# Patient Record
Sex: FEMALE | Race: WHITE
[De-identification: names, ages, dates, MRNs, and addresses within clinical notes are randomized per-mention and may not be internally consistent; named-entity substitution may affect disease eponyms.]

---

## 2020-01-11 ENCOUNTER — HOSPITAL ENCOUNTER (EMERGENCY)
Dept: HOSPITAL 94 - ER | Age: 40
Discharge: HOME | End: 2020-01-11
Payer: MEDICAID

## 2020-01-11 VITALS — DIASTOLIC BLOOD PRESSURE: 38 MMHG | SYSTOLIC BLOOD PRESSURE: 106 MMHG

## 2020-01-11 VITALS — HEIGHT: 68 IN | WEIGHT: 114.64 LBS | BODY MASS INDEX: 17.37 KG/M2

## 2020-01-11 DIAGNOSIS — Z88.1: ICD-10-CM

## 2020-01-11 DIAGNOSIS — J20.9: Primary | ICD-10-CM

## 2020-01-11 DIAGNOSIS — Z88.8: ICD-10-CM

## 2020-01-11 DIAGNOSIS — F17.200: ICD-10-CM

## 2020-01-11 DIAGNOSIS — Z88.0: ICD-10-CM

## 2020-01-11 PROCEDURE — 96372 THER/PROPH/DIAG INJ SC/IM: CPT

## 2020-01-11 PROCEDURE — 99283 EMERGENCY DEPT VISIT LOW MDM: CPT

## 2020-05-09 ENCOUNTER — HOSPITAL ENCOUNTER (EMERGENCY)
Dept: HOSPITAL 94 - ER | Age: 40
LOS: 2 days | Discharge: PSYCHIATRIC OR SUBSTANCE USE FACILITY | End: 2020-05-11
Payer: COMMERCIAL

## 2020-05-09 VITALS — WEIGHT: 123.46 LBS | BODY MASS INDEX: 18.71 KG/M2 | HEIGHT: 68 IN

## 2020-05-09 DIAGNOSIS — Z88.1: ICD-10-CM

## 2020-05-09 DIAGNOSIS — F32.9: ICD-10-CM

## 2020-05-09 DIAGNOSIS — Z88.2: ICD-10-CM

## 2020-05-09 DIAGNOSIS — Z79.899: ICD-10-CM

## 2020-05-09 DIAGNOSIS — F17.200: ICD-10-CM

## 2020-05-09 DIAGNOSIS — Z88.0: ICD-10-CM

## 2020-05-09 DIAGNOSIS — R45.851: Primary | ICD-10-CM

## 2020-05-09 LAB
ALBUMIN SERPL BCP-MCNC: 4 G/DL (ref 3.4–5)
ALBUMIN/GLOB SERPL: 1.2 {RATIO} (ref 1.1–1.5)
ALP SERPL-CCNC: 44 IU/L (ref 46–116)
ALT SERPL W P-5'-P-CCNC: 20 U/L (ref 12–78)
AMPHETAMINES UR QL SCN: NEGATIVE
ANION GAP SERPL CALCULATED.3IONS-SCNC: 7 MMOL/L (ref 8–16)
AST SERPL W P-5'-P-CCNC: 19 U/L (ref 10–37)
BACTERIA URNS QL MICRO: (no result) /HPF
BARBITURATES UR QL SCN: NEGATIVE
BASOPHILS # BLD AUTO: 0.1 X10'3 (ref 0–0.2)
BASOPHILS NFR BLD AUTO: 0.9 % (ref 0–1)
BENZODIAZ UR QL SCN: NEGATIVE
BILIRUB SERPL-MCNC: 0.6 MG/DL (ref 0.1–1)
BUN SERPL-MCNC: 10 MG/DL (ref 7–18)
BUN/CREAT SERPL: 13.5 (ref 6.6–38)
BZE UR QL SCN: NEGATIVE
CALCIUM SERPL-MCNC: 9.3 MG/DL (ref 8.5–10.1)
CANNABINOIDS UR QL SCN: POSITIVE
CHLORIDE SERPL-SCNC: 105 MMOL/L (ref 99–107)
CLARITY UR: CLEAR
CO2 SERPL-SCNC: 27.3 MMOL/L (ref 24–32)
COLOR UR: YELLOW
CREAT SERPL-MCNC: 0.74 MG/DL (ref 0.4–0.9)
DEPRECATED SQUAMOUS URNS QL MICRO: (no result) /LPF
EOSINOPHIL # BLD AUTO: 0 X10'3 (ref 0–0.9)
EOSINOPHIL NFR BLD AUTO: 0.6 % (ref 0–6)
ERYTHROCYTE [DISTWIDTH] IN BLOOD BY AUTOMATED COUNT: 13.3 % (ref 11.5–14.5)
ETHANOL SERPL-MCNC: < 0.01 GM/DL (ref 0–0.01)
GFR SERPL CREATININE-BSD FRML MDRD: 88 ML/MIN
GLUCOSE SERPL-MCNC: 96 MG/DL (ref 70–104)
GLUCOSE UR STRIP-MCNC: NEGATIVE MG/DL
HCG UR QL: NEGATIVE
HCT VFR BLD AUTO: 39.8 % (ref 35–45)
HGB BLD-MCNC: 13.2 G/DL (ref 12–16)
HGB UR QL STRIP: (no result)
KETONES UR STRIP-MCNC: (no result) MG/DL
LEUKOCYTE ESTERASE UR QL STRIP: (no result)
LYMPHOCYTES # BLD AUTO: 1.9 X10'3 (ref 1.1–4.8)
LYMPHOCYTES NFR BLD AUTO: 29.9 % (ref 21–51)
MCH RBC QN AUTO: 32.7 PG (ref 27–31)
MCHC RBC AUTO-ENTMCNC: 33.3 G/DL (ref 33–36.5)
MCV RBC AUTO: 98.2 FL (ref 78–98)
METHADONE UR QL SCN: NEGATIVE
MONOCYTES # BLD AUTO: 0.6 X10'3 (ref 0–0.9)
MONOCYTES NFR BLD AUTO: 9.5 % (ref 2–12)
MUCOUS THREADS URNS QL MICRO: (no result) /LPF
NEUTROPHILS # BLD AUTO: 3.8 X10'3 (ref 1.8–7.7)
NEUTROPHILS NFR BLD AUTO: 59.1 % (ref 42–75)
NITRITE UR QL STRIP: NEGATIVE
OPIATES UR QL SCN: NEGATIVE
PCP UR QL SCN: NEGATIVE
PH UR STRIP: 6 [PH] (ref 4.8–8)
PLATELET # BLD AUTO: 226 X10'3 (ref 140–440)
PMV BLD AUTO: 8.7 FL (ref 7.4–10.4)
POTASSIUM SERPL-SCNC: 3.7 MMOL/L (ref 3.5–5.1)
PROT SERPL-MCNC: 7.3 G/DL (ref 6.4–8.2)
PROT UR QL STRIP: NEGATIVE MG/DL
RBC # BLD AUTO: 4.05 X10'6 (ref 4.2–5.6)
RBC #/AREA URNS HPF: (no result) /HPF (ref 0–2)
SODIUM SERPL-SCNC: 139 MMOL/L (ref 135–145)
SP GR UR STRIP: 1.02 (ref 1–1.03)
URN COLLECT METHOD CLASS: (no result)
UROBILINOGEN UR STRIP-MCNC: 1 E.U/DL (ref 0.2–1)
WBC # BLD AUTO: 6.4 X10'3 (ref 4.5–11)
WBC #/AREA URNS HPF: (no result) /HPF (ref 0–4)

## 2020-05-09 PROCEDURE — 99285 EMERGENCY DEPT VISIT HI MDM: CPT

## 2020-05-09 PROCEDURE — 80320 DRUG SCREEN QUANTALCOHOLS: CPT

## 2020-05-09 PROCEDURE — 80053 COMPREHEN METABOLIC PANEL: CPT

## 2020-05-09 PROCEDURE — 80305 DRUG TEST PRSMV DIR OPT OBS: CPT

## 2020-05-09 PROCEDURE — 85025 COMPLETE CBC W/AUTO DIFF WBC: CPT

## 2020-05-09 PROCEDURE — 81025 URINE PREGNANCY TEST: CPT

## 2020-05-09 PROCEDURE — 81001 URINALYSIS AUTO W/SCOPE: CPT

## 2020-05-09 PROCEDURE — 36415 COLL VENOUS BLD VENIPUNCTURE: CPT

## 2020-05-09 PROCEDURE — 84443 ASSAY THYROID STIM HORMONE: CPT

## 2020-05-10 RX ADMIN — BUPROPION HYDROCHLORIDE SCH MG: 150 TABLET, FILM COATED, EXTENDED RELEASE ORAL at 12:00

## 2020-05-10 RX ADMIN — BUPROPION HYDROCHLORIDE SCH MG: 150 TABLET, FILM COATED, EXTENDED RELEASE ORAL at 20:10

## 2020-05-11 ENCOUNTER — HOSPITAL ENCOUNTER (INPATIENT)
Dept: HOSPITAL 94 - ADULT MH | Age: 40
LOS: 5 days | Discharge: HOME | DRG: 885 | End: 2020-05-16
Attending: PSYCHIATRY & NEUROLOGY | Admitting: PSYCHIATRY & NEUROLOGY
Payer: COMMERCIAL

## 2020-05-11 VITALS — HEIGHT: 68 IN | BODY MASS INDEX: 18.83 KG/M2 | WEIGHT: 124.25 LBS

## 2020-05-11 VITALS — DIASTOLIC BLOOD PRESSURE: 75 MMHG | SYSTOLIC BLOOD PRESSURE: 139 MMHG

## 2020-05-11 VITALS — SYSTOLIC BLOOD PRESSURE: 105 MMHG | DIASTOLIC BLOOD PRESSURE: 63 MMHG

## 2020-05-11 VITALS — DIASTOLIC BLOOD PRESSURE: 84 MMHG | SYSTOLIC BLOOD PRESSURE: 133 MMHG

## 2020-05-11 DIAGNOSIS — F10.20: ICD-10-CM

## 2020-05-11 DIAGNOSIS — Z82.49: ICD-10-CM

## 2020-05-11 DIAGNOSIS — Z80.41: ICD-10-CM

## 2020-05-11 DIAGNOSIS — Z71.6: ICD-10-CM

## 2020-05-11 DIAGNOSIS — R45.851: ICD-10-CM

## 2020-05-11 DIAGNOSIS — Z71.41: ICD-10-CM

## 2020-05-11 DIAGNOSIS — Z98.51: ICD-10-CM

## 2020-05-11 DIAGNOSIS — F12.20: ICD-10-CM

## 2020-05-11 DIAGNOSIS — Z82.5: ICD-10-CM

## 2020-05-11 DIAGNOSIS — F43.12: ICD-10-CM

## 2020-05-11 DIAGNOSIS — Z88.0: ICD-10-CM

## 2020-05-11 DIAGNOSIS — Z80.49: ICD-10-CM

## 2020-05-11 DIAGNOSIS — Z88.2: ICD-10-CM

## 2020-05-11 DIAGNOSIS — F17.200: ICD-10-CM

## 2020-05-11 DIAGNOSIS — F33.2: Primary | ICD-10-CM

## 2020-05-11 DIAGNOSIS — F41.9: ICD-10-CM

## 2020-05-11 PROCEDURE — 99285 EMERGENCY DEPT VISIT HI MDM: CPT

## 2020-05-11 PROCEDURE — 87081 CULTURE SCREEN ONLY: CPT

## 2020-05-11 PROCEDURE — 83036 HEMOGLOBIN GLYCOSYLATED A1C: CPT

## 2020-05-11 PROCEDURE — 36415 COLL VENOUS BLD VENIPUNCTURE: CPT

## 2020-05-11 PROCEDURE — 80061 LIPID PANEL: CPT

## 2020-05-11 RX ADMIN — BUPROPION HYDROCHLORIDE SCH MG: 150 TABLET, FILM COATED, EXTENDED RELEASE ORAL at 08:08

## 2020-05-11 RX ADMIN — BUPROPION HYDROCHLORIDE SCH MG: 150 TABLET, FILM COATED, EXTENDED RELEASE ORAL at 20:14
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
## 2020-05-11 NOTE — NUR
Malnutrition consult: patient reporting 14-23 lb loss. Per documented 

weight history she weighed 52 kg on 1/12/2020 and current weight is 56.36 

kg (both scale weights). Has gained 9.6 lbs since January. No edema. 

Eating % of two meals documented on 5/10 in ED hold. Does not meet more than two 
criteria for malnutrition at this time.

-------------------------------------------------------------------------------

Addendum: 05/11/20 at 1525 by Naz Keyes RD

-------------------------------------------------------------------------------

Amended: Links added.

## 2020-05-12 VITALS — SYSTOLIC BLOOD PRESSURE: 111 MMHG | DIASTOLIC BLOOD PRESSURE: 66 MMHG

## 2020-05-12 VITALS — DIASTOLIC BLOOD PRESSURE: 50 MMHG | SYSTOLIC BLOOD PRESSURE: 105 MMHG

## 2020-05-12 LAB
CHOLEST SERPL-MCNC: 140 MG/DL (ref 0–200)
CHOLEST/HDLC SERPL: 2.3 {RATIO} (ref 0–4.99)
HBA1C MFR BLD: 5.5 % (ref 4.5–6.2)
HDLC SERPL-MCNC: 61 MG/DL (ref 35–60)
LDLC SERPL DIRECT ASSAY-MCNC: 63 MG/DL (ref 50–100)
TRIGL SERPL-MCNC: 54 MG/DL (ref 20–135)

## 2020-05-12 RX ADMIN — BUPROPION HYDROCHLORIDE SCH MG: 150 TABLET, FILM COATED, EXTENDED RELEASE ORAL at 07:28

## 2020-05-12 RX ADMIN — NICOTINE POLACRILEX PRN MG: 2 LOZENGE ORAL at 18:58
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
## 2020-05-13 VITALS — DIASTOLIC BLOOD PRESSURE: 65 MMHG | SYSTOLIC BLOOD PRESSURE: 113 MMHG

## 2020-05-13 VITALS — SYSTOLIC BLOOD PRESSURE: 100 MMHG | DIASTOLIC BLOOD PRESSURE: 70 MMHG

## 2020-05-13 RX ADMIN — BUPROPION HYDROCHLORIDE SCH MG: 150 TABLET, FILM COATED, EXTENDED RELEASE ORAL at 07:36

## 2020-05-13 RX ADMIN — NICOTINE POLACRILEX PRN MG: 2 LOZENGE ORAL at 14:34
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
## 2020-05-13 NOTE — NUR
Nursing Progress Note



Legal hold:5150



Client is on an involuntary hold for being a danger to herself



Report received from Fady LONG with use of SBAR[].



Why are they here: The patient is a 39 year old female admitted on 5/11/20 for inpatient 
psychiatric stabilization after she as placed on a 5150 hold as a danger to herself. She had 
been becoming increasingly depressed and began to have suicidal thoughts to stab herself in 
the femoral artery. She has been overwhelmed with life stressors and grief and loss issues: 
Her stepson had been killed by a train, her significant other left her, she recently had to 
have her horse euthanized. She has chronic pain and depression







Assessment



What has happened this shift: The patient has been up on the unit and attempting to 
socialize with higher functioning peers. The patient stated that she felt she needed to try 
and distract herself from negative thoughts. She also reports she is having racing thoughts. 
She stated that at the time of the evening assessment her anxiety was low. She described her 
mood as "good" She stated that she felt stress from being idle on the unit and that there 
had been no groups or activities. She denies mood lability. She stated that her energy level 
was good. She denies active or passive suicidal thinking and stated that she felt hopeful 
about her future. She was animated during the assessment. Her speech was mildly hyper verbal 
but she answered all questions appropriately. 

S/I, H/I: Denied

A/VH:  Denied

ADL's: Grooming adequate. Stated she showered earlier in the day

Were meds taken: yes

Any med S/E: Denied by the patient





Mental Status Exam



Appearance: Clean, dressed appropriately for the unit

Eye contact: direct

Behavior: cooperative, calm and friendly with peers and staff

Speech: mildly hyper verbal. Spontaneous

Mood:  Improved from admit. Stated she felt her mood was good and anxiety level was low

Affect: Appropriate to stated mood

Thought process: Logical, linear. Reports her thoughts were racing

Thought Content: Discussed desire to discharge soon. She reports the content of her thoughts 
were negative

Cognition: Alert and oriented 

Insight: Fair

Judgment: Fair







Interventions



PRN's used: none

Therapeutic interventions: One to one with the patient to assess severity of depressive 
symptoms and self harm risk. 





Justification of Continued Inpatient Treatment: The patient has had recent medication 
changes. Continuing to monitor response to medications and for self harm risk.
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
## 2020-05-14 VITALS — SYSTOLIC BLOOD PRESSURE: 97 MMHG | DIASTOLIC BLOOD PRESSURE: 50 MMHG

## 2020-05-14 VITALS — DIASTOLIC BLOOD PRESSURE: 50 MMHG | SYSTOLIC BLOOD PRESSURE: 97 MMHG

## 2020-05-14 VITALS — DIASTOLIC BLOOD PRESSURE: 66 MMHG | SYSTOLIC BLOOD PRESSURE: 119 MMHG

## 2020-05-14 RX ADMIN — BUPROPION HYDROCHLORIDE SCH MG: 150 TABLET, FILM COATED, EXTENDED RELEASE ORAL at 07:38

## 2020-05-14 RX ADMIN — VENLAFAXINE HYDROCHLORIDE SCH MG: 37.5 CAPSULE, EXTENDED RELEASE ORAL at 07:38

## 2020-05-14 NOTE — NUR
Initial: Pt admit w/ MDD, PTSD, anxiety, and cannabinoid/etoh moderate 

abuse. PO % avg regular diet meeting needs. LBM 5/13. No 

nutrition concerns at this time. SHARIF d/w RN regarding thiamin, folic, MVI 

supplementation if MD agreeable given hx. Will continue to monitor.

Rec:

1. continue regular diet

2. thiamin, folic, MVI for etoh hx

3. bowel care as needed

4. wt per rx

-------------------------------------------------------------------------------

Addendum: 05/14/20 at 1408 by Ramin Lugo RD

-------------------------------------------------------------------------------

Amended: Links added.

## 2020-05-14 NOTE — NUR
Nursing Progress Note



Legal hold: 5150   @1325





Why are they here: The patient is a 39 year old female admitted on 20 for inpatient 
psychiatric stabilization after she was placed on a 5150 hold as a danger to herself. She 
had been becoming increasingly depressed and began to have suicidal thoughts to stab herself 
in the femoral artery. She has been overwhelmed with life stressors and grief and loss 
issues: Her stepson had been killed by a train, her significant other left her, she recently 
had to have her horse euthanized. She has chronic pain and depression







Assessment



What has happened this shift: No events this shift, patient had no issues.

S/I, H/I: Denied

A/VH:  Denied

ADL's: Grooming adequate. Stated she showered earlier in the day.

Were meds taken: yes

Any med S/E: Denied by the patient





Mental Status Exam



Appearance: Clean, dressed appropriately for the unit

Eye contact: direct

Behavior: cooperative, calm and friendly with peers and staff

Speech: short and quick

Mood: Stated she felt her mood was good and anxiety level was low.

Affect: Appropriate to stated mood

Thought process: Logical, linear. 

Thought Content: Discussed desire to discharge soon. 

Cognition: Alert and oriented 

Insight: Fair

Judgment: Fair







Interventions



PRN's used: none

Therapeutic interventions: 



Justification of Continued Inpatient Treatment: The patient has had recent medication 
changes. Continuing to monitor response to medications and for self harm risk.

## 2020-05-15 VITALS — DIASTOLIC BLOOD PRESSURE: 58 MMHG | SYSTOLIC BLOOD PRESSURE: 103 MMHG

## 2020-05-15 VITALS — SYSTOLIC BLOOD PRESSURE: 104 MMHG | DIASTOLIC BLOOD PRESSURE: 61 MMHG

## 2020-05-15 RX ADMIN — VENLAFAXINE HYDROCHLORIDE SCH MG: 37.5 CAPSULE, EXTENDED RELEASE ORAL at 08:49

## 2020-05-15 NOTE — NUR
Nursing Progress Note



Legal hold: 5250



Client is on an involuntary hold for DTS



Report received from Yarely Donovan RN with use of SBAR.



Why are they here: The patient is a 39 year old female admitted on 5/11/20 for inpatient 
psychiatric stabilization after she as placed on a 5150 hold as a danger to herself. She had 
been becoming increasingly depressed and began to have suicidal thoughts to stab herself in 
the femoral artery. She has been overwhelmed with life stressors and grief and loss issues: 
Her stepson had been killed by a train, her significant other left her, she recently had to 
have her horse euthanized. She has chronic pain and depression



Assessment



What has happened this shift: Pt up with the other pts participating in groups and outside 
activities. Pt shared, I finally have a diagnosis, Major Depressive Disorder and PTSD. She 
denies SI. She states, shell be going home from here and will participate in therapy and 
outpt services through [facility]. Her medications are being adjusted she is being tapered off 
Wellbutrin and titrated up on Effexor. 

S/I, H/I: Denied

A/VH:  Denied

ADL's: Independent 

Were meds taken: yes

Any med S/E: None observed or reported



Mental Status Exam



Appearance: Clean

Eye contact: Direct

Behavior: Socializing, pleasant and cooperative

Speech: Clear, steady rate/rhythm, audible 

Mood: Euthymic 

Affect: Constricted

Thought process: Linear

Thought Content: Focused on getting better and going home 

Cognition: A/O x4

Insight: Fair

Judgment: Fair



Interventions



PRN's used: none

Therapeutic interventions: Introduced self and established rapport, ensured contract for 
safety, maintained a safe and therapeutic environment, provided clear and simple 
instructions, monitored v/s and maintained Q 15min safety checks.



Justification of Continued Inpatient Treatment: The patients medications are being adjusted 
and changed. Will continue to monitor response to medications and for self harm risk.

## 2020-05-15 NOTE — NUR
Nursing Progress Note



Legal hold: 5250



Client is on an involuntary hold for DTS



Report received from Mayelin LONG with use of SBAR.



Why are they here: The patient is a 39 year old female admitted on 5/11/20 for inpatient 
psychiatric stabilization after she as placed on a 5150 hold as a danger to herself. She had 
been becoming increasingly depressed and began to have suicidal thoughts to stab herself in 
the femoral artery. She has been overwhelmed with life stressors and grief and loss issues: 
Her stepson had been killed by a train, her significant other left her, she recently had to 
have her horse euthanized. She has chronic pain and depression







Assessment



What has happened this shift: 

Patient visible on the unit, walking the camp and socializing with peers. Pleasant and 
cooperative with all care; compliant with medication. She denies SI, HI, A/VH this shift. 
Patient endorses mild depression with improvement since admission. Participated in HS snack 
and movie in the group room before bed.  



S/I, H/I: Denied

A/VH:  Denied

ADL's: Independent 

Were meds taken: yes

Any med S/E: None observed or reported





Mental Status Exam



Appearance: Clean, dressed appropriately for the unit

Eye contact: Direct

Behavior: Socializing, watching movies, pleasant and cooperative

Speech: Clear, steady rate/rhythm, audible 

Mood: "Better"

Affect: Constricted

Thought process: Linear

Thought Content: depression improving

Cognition: Alert and oriented 

Insight: Fair

Judgment: Fair







Interventions

PRN's used: none



Therapeutic interventions:  Introduced self and established rapport, ensured contract for 
safety, maintained a safe and therapeutic environment, provided clear and simple 
instructions, monitored v/s and maintained Q 15min safety checks.





Justification of Continued Inpatient Treatment: The patient has had recent medication 
changes. Continuing to monitor response to medications and for self harm risk.

## 2020-05-16 VITALS — DIASTOLIC BLOOD PRESSURE: 63 MMHG | SYSTOLIC BLOOD PRESSURE: 99 MMHG

## 2020-05-16 RX ADMIN — VENLAFAXINE HYDROCHLORIDE SCH MG: 37.5 CAPSULE, EXTENDED RELEASE ORAL at 07:38

## 2020-05-16 NOTE — NUR
Nursing Progress Note



Legal hold: 5250



Client is on an involuntary hold for DTS



Report received from Mayelin LONG with use of SBAR.



Why are they here: The patient is a 39 year old female admitted on 5/11/20 for inpatient 
psychiatric stabilization after she as placed on a 5150 hold as a danger to herself. She had 
been becoming increasingly depressed and began to have suicidal thoughts to stab herself in 
the femoral artery. She has been overwhelmed with life stressors and grief and loss issues: 
Her stepson had been killed by a train, her significant other left her, she recently had to 
have her horse euthanized. She has chronic pain and depression







Assessment



What has happened this shift: 

Patient on the unit socializing with peers at the beginning of shift. Pleasant and 
cooperative with all care; compliant with medication. Patient denies all mental health 
symptoms this shift and looking forward to discharge next shift. She reports she'll be 
attending counseling post discharge. She participated in HS snack and watching a movie in 
group room before bed.   



S/I, H/I: Denied

A/VH:  Denied

ADL's: Independent 

Were meds taken: yes

Any med S/E: None observed or reported





Mental Status Exam



Appearance: Clean, dressed appropriately for the unit

Eye contact: Direct

Behavior: Socializing, watching movies, coloring, pleasant and cooperative

Speech: Clear, steady rate/rhythm, audible 

Mood: "Good"

Affect: Constricted

Thought process: Linear

Thought Content: Discharge

Cognition: Alert and oriented 

Insight: Fair

Judgment: Fair







Interventions

PRN's used: None



Therapeutic interventions:  Introduced self and established rapport, ensured contract for 
safety, maintained a safe and therapeutic environment, provided clear and simple 
instructions, monitored v/s and maintained Q 15min safety checks.





Justification of Continued Inpatient Treatment: The patient has had recent medication 
changes. Continuing to monitor response to medications and for self harm risk.

## 2020-05-16 NOTE — NUR
Discharge Note: 



Pt discharged home w/BF and one of her children. Pt was happy to be going home. She went 
around and told everyone "Good-bye." All pt's personal belongings inventoried and pt signed 
off with ALOK Tipton. 



Personal belongings from the safe returned to pt witnessed by ALOK Tipton and this writer. Pt's 
prescriptions given to her after being faxed in since the pharmacy did not respond with a 
confirmation page.  



Pt will follow up w/Dr. Holley and will see a therapist at [facility]. 



Pt declined smoking cessation resources or education.